# Patient Record
Sex: MALE | URBAN - METROPOLITAN AREA
[De-identification: names, ages, dates, MRNs, and addresses within clinical notes are randomized per-mention and may not be internally consistent; named-entity substitution may affect disease eponyms.]

---

## 2022-01-01 ENCOUNTER — APPOINTMENT (OUTPATIENT)
Dept: CARDIOLOGY | Facility: CLINIC | Age: 32
End: 2022-01-01
Attending: STUDENT IN AN ORGANIZED HEALTH CARE EDUCATION/TRAINING PROGRAM
Payer: COMMERCIAL

## 2022-01-01 ENCOUNTER — HOSPITAL ENCOUNTER (INPATIENT)
Facility: CLINIC | Age: 32
LOS: 1 days | End: 2022-03-27
Admitting: STUDENT IN AN ORGANIZED HEALTH CARE EDUCATION/TRAINING PROGRAM
Payer: COMMERCIAL

## 2022-01-01 ENCOUNTER — APPOINTMENT (OUTPATIENT)
Dept: GENERAL RADIOLOGY | Facility: CLINIC | Age: 32
End: 2022-01-01
Attending: STUDENT IN AN ORGANIZED HEALTH CARE EDUCATION/TRAINING PROGRAM
Payer: COMMERCIAL

## 2022-01-01 ENCOUNTER — APPOINTMENT (OUTPATIENT)
Dept: RADIOLOGY | Facility: HOSPITAL | Age: 32
End: 2022-01-01
Attending: EMERGENCY MEDICINE
Payer: COMMERCIAL

## 2022-01-01 ENCOUNTER — APPOINTMENT (OUTPATIENT)
Dept: NUCLEAR MEDICINE | Facility: CLINIC | Age: 32
End: 2022-01-01
Attending: NURSE PRACTITIONER
Payer: COMMERCIAL

## 2022-01-01 ENCOUNTER — APPOINTMENT (OUTPATIENT)
Dept: CT IMAGING | Facility: CLINIC | Age: 32
End: 2022-01-01
Attending: STUDENT IN AN ORGANIZED HEALTH CARE EDUCATION/TRAINING PROGRAM
Payer: COMMERCIAL

## 2022-01-01 ENCOUNTER — HOSPITAL ENCOUNTER (EMERGENCY)
Facility: HOSPITAL | Age: 32
Discharge: ANOTHER HOSPITAL | End: 2022-03-27
Attending: EMERGENCY MEDICINE | Admitting: EMERGENCY MEDICINE
Payer: COMMERCIAL

## 2022-01-01 VITALS — OXYGEN SATURATION: 86 % | HEART RATE: 120 BPM | RESPIRATION RATE: 26 BRPM | TEMPERATURE: 98.6 F

## 2022-01-01 VITALS
HEART RATE: 91 BPM | DIASTOLIC BLOOD PRESSURE: 36 MMHG | RESPIRATION RATE: 24 BRPM | OXYGEN SATURATION: 71 % | SYSTOLIC BLOOD PRESSURE: 77 MMHG | TEMPERATURE: 94.2 F

## 2022-01-01 DIAGNOSIS — I46.9 CARDIAC ARREST (H): ICD-10-CM

## 2022-01-01 LAB
ABO/RH(D): NORMAL
ALBUMIN SERPL-MCNC: 1.4 G/DL (ref 3.5–5)
ALBUMIN SERPL-MCNC: 1.6 G/DL (ref 3.5–5)
ALBUMIN SERPL-MCNC: 2.6 G/DL (ref 3.4–5)
ALBUMIN UR-MCNC: 70 MG/DL
ALP SERPL-CCNC: 21 U/L (ref 45–120)
ALP SERPL-CCNC: 239 U/L (ref 40–150)
ALP SERPL-CCNC: 84 U/L (ref 45–120)
ALT SERPL W P-5'-P-CCNC: 190 U/L (ref 0–45)
ALT SERPL W P-5'-P-CCNC: 42 U/L (ref 0–45)
ALT SERPL W P-5'-P-CCNC: 435 U/L (ref 0–70)
AMMONIA PLAS-SCNC: 72 UMOL/L (ref 10–50)
AMPHETAMINES UR QL SCN: NORMAL
ANION GAP SERPL CALCULATED.3IONS-SCNC: 12 MMOL/L (ref 3–14)
ANION GAP SERPL CALCULATED.3IONS-SCNC: 15 MMOL/L (ref 5–18)
ANION GAP SERPL CALCULATED.3IONS-SCNC: ABNORMAL MMOL/L
ANTIBODY SCREEN: NEGATIVE
APPEARANCE UR: ABNORMAL
APTT PPP: 83 SECONDS (ref 22–38)
AST SERPL W P-5'-P-CCNC: 227 U/L (ref 0–40)
AST SERPL W P-5'-P-CCNC: 51 U/L (ref 0–40)
AST SERPL W P-5'-P-CCNC: 572 U/L (ref 0–45)
BARBITURATES UR QL: NORMAL
BASE EXCESS BLDA CALC-SCNC: -10.9 MMOL/L (ref -9–1.8)
BASE EXCESS BLDA CALC-SCNC: -11 MMOL/L (ref -9–1.8)
BASE EXCESS BLDA CALC-SCNC: -12.2 MMOL/L (ref -9–1.8)
BASE EXCESS BLDA CALC-SCNC: -12.2 MMOL/L (ref -9–1.8)
BASE EXCESS BLDA CALC-SCNC: -16.9 MMOL/L (ref -9.6–2)
BASE EXCESS BLDA CALC-SCNC: -8 MMOL/L (ref -9.6–2)
BASE EXCESS BLDA CALC-SCNC: -9.6 MMOL/L (ref -9.6–2)
BASE EXCESS BLDV CALC-SCNC: -12 MMOL/L (ref -7.7–1.9)
BASE EXCESS BLDV CALC-SCNC: ABNORMAL MMOL/L
BASOPHILS # BLD MANUAL: 0 10E3/UL (ref 0–0.2)
BASOPHILS # BLD MANUAL: 0 10E3/UL (ref 0–0.2)
BASOPHILS # BLD MANUAL: 0.1 10E3/UL (ref 0–0.2)
BASOPHILS NFR BLD MANUAL: 0 %
BASOPHILS NFR BLD MANUAL: 0 %
BASOPHILS NFR BLD MANUAL: 1 %
BENZODIAZ UR QL: NORMAL
BILIRUB SERPL-MCNC: 0.2 MG/DL (ref 0–1)
BILIRUB SERPL-MCNC: 0.4 MG/DL (ref 0.2–1.3)
BILIRUB SERPL-MCNC: <0.1 MG/DL (ref 0–1)
BILIRUB UR QL STRIP: NEGATIVE
BLD PROD TYP BPU: NORMAL
BLOOD COMPONENT TYPE: NORMAL
BUN SERPL-MCNC: 15 MG/DL (ref 8–22)
BUN SERPL-MCNC: 26 MG/DL (ref 7–30)
BUN SERPL-MCNC: 8 MG/DL (ref 8–22)
BURR CELLS BLD QL SMEAR: ABNORMAL
BURR CELLS BLD QL SMEAR: SLIGHT
BURR CELLS BLD QL SMEAR: SLIGHT
CA-I BLD-MCNC: 4.4 MG/DL (ref 4.4–5.2)
CA-I BLD-MCNC: 4.5 MG/DL (ref 4.4–5.2)
CA-I BLD-MCNC: 4.7 MG/DL (ref 4.4–5.2)
CA-I BLD-MCNC: 4.8 MG/DL (ref 4.4–5.2)
CALCIUM SERPL-MCNC: 4.1 MG/DL (ref 8.5–10.5)
CALCIUM SERPL-MCNC: 5.6 MG/DL (ref 8.5–10.5)
CALCIUM SERPL-MCNC: 8 MG/DL (ref 8.5–10.1)
CANNABINOIDS UR QL SCN: NORMAL
CF REDUC 30M P MA P HEP LENFR BLD TEG: 0 % (ref 0–8)
CF REDUC 60M P MA P HEPASE LENFR BLD TEG: 0 % (ref 0–15)
CFT P HPASE BLD TEG: 1.7 MINUTE (ref 1–3)
CHLORIDE BLD-SCNC: 112 MMOL/L (ref 94–109)
CHLORIDE BLD-SCNC: 118 MMOL/L (ref 98–107)
CHLORIDE BLD-SCNC: <50 MMOL/L (ref 98–107)
CI (COAGULATION INDEX)(Z): 1 (ref -3–3)
CLOT ANGLE P HPASE BLD TEG: 66.3 DEGREES (ref 53–72)
CLOT INIT P HPASE BLD TEG: 4.8 MINUTE (ref 5–10)
CLOT STRENGTH P HPASE BLD TEG: 8 KD/SC (ref 4.5–11)
CO2 SERPL-SCNC: 10 MMOL/L (ref 22–31)
CO2 SERPL-SCNC: 23 MMOL/L (ref 20–32)
CO2 SERPL-SCNC: 37 MMOL/L (ref 22–31)
COCAINE UR QL: NORMAL
CODING SYSTEM: NORMAL
COLOR UR AUTO: ABNORMAL
CORTIS SERPL-MCNC: 23.1 UG/DL (ref 4–22)
CREAT SERPL-MCNC: 0.39 MG/DL (ref 0.7–1.3)
CREAT SERPL-MCNC: 1.51 MG/DL (ref 0.7–1.3)
CREAT SERPL-MCNC: 2.3 MG/DL (ref 0.66–1.25)
CROSSMATCH: NORMAL
CROSSMATCH: NORMAL
CRP SERPL-MCNC: <2.9 MG/L (ref 0–8)
EOSINOPHIL # BLD MANUAL: 0 10E3/UL (ref 0–0.7)
EOSINOPHIL # BLD MANUAL: 0 10E3/UL (ref 0–0.7)
EOSINOPHIL # BLD MANUAL: 0.1 10E3/UL (ref 0–0.7)
EOSINOPHIL NFR BLD MANUAL: 0 %
EOSINOPHIL NFR BLD MANUAL: 0 %
EOSINOPHIL NFR BLD MANUAL: 1 %
ERYTHROCYTE [DISTWIDTH] IN BLOOD BY AUTOMATED COUNT: 13.5 % (ref 10–15)
ERYTHROCYTE [DISTWIDTH] IN BLOOD BY AUTOMATED COUNT: 13.6 % (ref 10–15)
ERYTHROCYTE [DISTWIDTH] IN BLOOD BY AUTOMATED COUNT: 13.6 % (ref 10–15)
ERYTHROCYTE [SEDIMENTATION RATE] IN BLOOD BY WESTERGREN METHOD: 3 MM/HR (ref 0–15)
ETHANOL UR QL SCN: NORMAL
FRAGMENTS BLD QL SMEAR: SLIGHT
GFR SERPL CREATININE-BSD FRML MDRD: 38 ML/MIN/1.73M2
GFR SERPL CREATININE-BSD FRML MDRD: 63 ML/MIN/1.73M2
GFR SERPL CREATININE-BSD FRML MDRD: >90 ML/MIN/1.73M2
GLUCOSE BLD-MCNC: 187 MG/DL (ref 70–125)
GLUCOSE BLD-MCNC: 358 MG/DL (ref 70–99)
GLUCOSE BLD-MCNC: 369 MG/DL (ref 70–99)
GLUCOSE BLD-MCNC: 385 MG/DL (ref 70–99)
GLUCOSE BLD-MCNC: 414 MG/DL (ref 70–125)
GLUCOSE BLD-MCNC: 417 MG/DL (ref 70–99)
GLUCOSE BLD-MCNC: 449 MG/DL (ref 70–99)
GLUCOSE BLDC GLUCOMTR-MCNC: 103 MG/DL (ref 70–99)
GLUCOSE BLDC GLUCOMTR-MCNC: 131 MG/DL (ref 70–99)
GLUCOSE BLDC GLUCOMTR-MCNC: 165 MG/DL (ref 70–99)
GLUCOSE BLDC GLUCOMTR-MCNC: 189 MG/DL (ref 70–99)
GLUCOSE BLDC GLUCOMTR-MCNC: 221 MG/DL (ref 70–99)
GLUCOSE BLDC GLUCOMTR-MCNC: 275 MG/DL (ref 70–99)
GLUCOSE BLDC GLUCOMTR-MCNC: 287 MG/DL (ref 70–99)
GLUCOSE BLDC GLUCOMTR-MCNC: 294 MG/DL (ref 70–99)
GLUCOSE BLDC GLUCOMTR-MCNC: 330 MG/DL (ref 70–99)
GLUCOSE BLDC GLUCOMTR-MCNC: 99 MG/DL (ref 70–99)
GLUCOSE UR STRIP-MCNC: 300 MG/DL
HBA1C MFR BLD: 5.6 % (ref 0–5.6)
HCO3 BLD-SCNC: 20 MMOL/L (ref 21–28)
HCO3 BLD-SCNC: 21 MMOL/L (ref 21–28)
HCO3 BLD-SCNC: 22 MMOL/L (ref 21–28)
HCO3 BLD-SCNC: 23 MMOL/L (ref 21–28)
HCO3 BLDA-SCNC: 17 MMOL/L (ref 21–28)
HCO3 BLDA-SCNC: 23 MMOL/L (ref 21–28)
HCO3 BLDA-SCNC: 25 MMOL/L (ref 21–28)
HCO3 BLDV-SCNC: 23 MMOL/L (ref 21–28)
HCO3 BLDV-SCNC: ABNORMAL MMOL/L
HCT VFR BLD AUTO: 13.7 % (ref 40–53)
HCT VFR BLD AUTO: 53.6 % (ref 40–53)
HCT VFR BLD AUTO: 53.7 % (ref 40–53)
HGB BLD-MCNC: 14.4 G/DL (ref 13.3–17.7)
HGB BLD-MCNC: 15.8 G/DL (ref 13.3–17.7)
HGB BLD-MCNC: 15.9 G/DL (ref 13.3–17.7)
HGB BLD-MCNC: 16.6 G/DL (ref 13.3–17.7)
HGB BLD-MCNC: 16.6 G/DL (ref 13.3–17.7)
HGB BLD-MCNC: 4.7 G/DL (ref 13.3–17.7)
HGB UR QL STRIP: ABNORMAL
HOLD SPECIMEN: NORMAL
HYALINE CASTS: 6 /LPF
INR PPP: 1.7 (ref 0.85–1.15)
INR PPP: 2.25 (ref 0.85–1.15)
ISSUE DATE AND TIME: NORMAL
KETONES UR STRIP-MCNC: NEGATIVE MG/DL
LACTATE BLD-SCNC: 6.3 MMOL/L
LACTATE BLD-SCNC: 7.6 MMOL/L
LACTATE BLD-SCNC: 7.9 MMOL/L
LACTATE SERPL-SCNC: 10.1 MMOL/L (ref 0.7–2)
LACTATE SERPL-SCNC: 6.2 MMOL/L (ref 0.7–2)
LACTATE SERPL-SCNC: 6.3 MMOL/L (ref 0.7–2)
LACTATE SERPL-SCNC: 7.3 MMOL/L (ref 0.7–2)
LACTATE SERPL-SCNC: 7.4 MMOL/L (ref 0.7–2)
LACTATE SERPL-SCNC: 7.8 MMOL/L (ref 0.7–2)
LDH SERPL L TO P-CCNC: 1153 U/L (ref 85–227)
LEUKOCYTE ESTERASE UR QL STRIP: NEGATIVE
LVEF ECHO: NORMAL
LYMPHOCYTES # BLD MANUAL: 2.6 10E3/UL (ref 0.8–5.3)
LYMPHOCYTES # BLD MANUAL: 2.9 10E3/UL (ref 0.8–5.3)
LYMPHOCYTES # BLD MANUAL: 3.8 10E3/UL (ref 0.8–5.3)
LYMPHOCYTES NFR BLD MANUAL: 46 %
LYMPHOCYTES NFR BLD MANUAL: 50 %
LYMPHOCYTES NFR BLD MANUAL: 60 %
MCF P HPASE BLD TEG: 61.4 MM (ref 50–70)
MCH RBC QN AUTO: 30 PG (ref 26.5–33)
MCH RBC QN AUTO: 30 PG (ref 26.5–33)
MCH RBC QN AUTO: 30.3 PG (ref 26.5–33)
MCHC RBC AUTO-ENTMCNC: 30.9 G/DL (ref 31.5–36.5)
MCHC RBC AUTO-ENTMCNC: 31 G/DL (ref 31.5–36.5)
MCHC RBC AUTO-ENTMCNC: 34.3 G/DL (ref 31.5–36.5)
MCV RBC AUTO: 88 FL (ref 78–100)
MCV RBC AUTO: 97 FL (ref 78–100)
MCV RBC AUTO: 97 FL (ref 78–100)
METAMYELOCYTES # BLD MANUAL: 0.2 10E3/UL
METAMYELOCYTES NFR BLD MANUAL: 4 %
MONOCYTES # BLD MANUAL: 0 10E3/UL (ref 0–1.3)
MONOCYTES # BLD MANUAL: 0 10E3/UL (ref 0–1.3)
MONOCYTES # BLD MANUAL: 0.2 10E3/UL (ref 0–1.3)
MONOCYTES NFR BLD MANUAL: 0 %
MONOCYTES NFR BLD MANUAL: 0 %
MONOCYTES NFR BLD MANUAL: 3 %
MUCOUS THREADS #/AREA URNS LPF: PRESENT /LPF
MYELOCYTES # BLD MANUAL: 0.2 10E3/UL
MYELOCYTES # BLD MANUAL: 0.3 10E3/UL
MYELOCYTES # BLD MANUAL: 0.4 10E3/UL
MYELOCYTES NFR BLD MANUAL: 3 %
MYELOCYTES NFR BLD MANUAL: 5 %
MYELOCYTES NFR BLD MANUAL: 7 %
NEUTROPHILS # BLD MANUAL: 2 10E3/UL (ref 1.6–8.3)
NEUTROPHILS # BLD MANUAL: 2.1 10E3/UL (ref 1.6–8.3)
NEUTROPHILS # BLD MANUAL: 3 10E3/UL (ref 1.6–8.3)
NEUTROPHILS NFR BLD MANUAL: 33 %
NEUTROPHILS NFR BLD MANUAL: 40 %
NEUTROPHILS NFR BLD MANUAL: 47 %
NITRATE UR QL: NEGATIVE
NRBC # BLD AUTO: 0.1 10E3/UL
NRBC # BLD AUTO: 0.2 10E3/UL
NRBC # BLD AUTO: 0.7 10E3/UL
NRBC BLD MANUAL-RTO: 13 %
NRBC BLD MANUAL-RTO: 2 %
NRBC BLD MANUAL-RTO: 3 %
O2/TOTAL GAS SETTING VFR VENT: 100 %
OPIATES UR QL SCN: NORMAL
OXYHGB MFR BLD: 32 % (ref 92–100)
OXYHGB MFR BLD: 35 % (ref 92–100)
OXYHGB MFR BLD: 40 % (ref 92–100)
OXYHGB MFR BLD: 54 % (ref 92–100)
OXYHGB MFR BLDA: 47 % (ref 92–100)
OXYHGB MFR BLDA: 54 % (ref 92–100)
OXYHGB MFR BLDA: 76 % (ref 92–100)
OXYHGB MFR BLDV: 16.9 % (ref 70–75)
OXYHGB MFR BLDV: 20 % (ref 70–75)
PCO2 BLD: 74 MM HG (ref 35–45)
PCO2 BLD: 82 MM HG (ref 35–45)
PCO2 BLD: 82 MM HG (ref 35–45)
PCO2 BLD: 91 MM HG (ref 35–45)
PCO2 BLDA: 83 MM HG (ref 35–45)
PCO2 BLDA: 84 MM HG (ref 35–45)
PCO2 BLDA: 88 MM HG (ref 35–45)
PCO2 BLDV: 97 MM HG (ref 40–50)
PCO2 BLDV: >98 MM HG (ref 35–50)
PH BLD: 7.01 [PH] (ref 7.35–7.45)
PH BLD: 7.02 [PH] (ref 7.35–7.45)
PH BLD: 7.04 [PH] (ref 7.35–7.45)
PH BLD: 7.05 [PH] (ref 7.35–7.45)
PH BLDA: 6.93 [PH] (ref 7.35–7.45)
PH BLDA: 7.05 [PH] (ref 7.35–7.45)
PH BLDA: 7.05 [PH] (ref 7.35–7.45)
PH BLDV: 6.98 [PH] (ref 7.32–7.43)
PH BLDV: 7.66 [PH] (ref 7.35–7.45)
PH UR STRIP: 6 [PH] (ref 5–7)
PLAT MORPH BLD: ABNORMAL
PLATELET # BLD AUTO: 103 10E3/UL (ref 150–450)
PLATELET # BLD AUTO: 251 10E3/UL (ref 150–450)
PLATELET # BLD AUTO: 253 10E3/UL (ref 150–450)
PO2 BLD: 25 MM HG (ref 80–105)
PO2 BLD: 26 MM HG (ref 80–105)
PO2 BLD: 29 MM HG (ref 80–105)
PO2 BLD: 34 MM HG (ref 80–105)
PO2 BLDA: 35 MM HG (ref 80–105)
PO2 BLDA: 35 MM HG (ref 80–105)
PO2 BLDA: 53 MM HG (ref 80–105)
PO2 BLDV: 16 MM HG (ref 25–47)
PO2 BLDV: 20 MM HG (ref 25–47)
POTASSIUM BLD-SCNC: 1.7 MMOL/L (ref 3.5–5)
POTASSIUM BLD-SCNC: 4.1 MMOL/L (ref 3.4–5.3)
POTASSIUM BLD-SCNC: 4.2 MMOL/L (ref 3.5–5)
POTASSIUM BLD-SCNC: 4.9 MMOL/L (ref 3.5–5)
POTASSIUM BLD-SCNC: 4.9 MMOL/L (ref 3.5–5)
POTASSIUM BLD-SCNC: 6.4 MMOL/L (ref 3.5–5)
PROCALCITONIN SERPL-MCNC: 1.82 NG/ML
PROT SERPL-MCNC: 1 G/DL (ref 6–8)
PROT SERPL-MCNC: 2.8 G/DL (ref 6–8)
PROT SERPL-MCNC: 5.5 G/DL (ref 6.8–8.8)
RADIOLOGIST FLAGS: ABNORMAL
RADIOLOGIST FLAGS: ABNORMAL
RBC # BLD AUTO: 1.55 10E6/UL (ref 4.4–5.9)
RBC # BLD AUTO: 5.53 10E6/UL (ref 4.4–5.9)
RBC # BLD AUTO: 5.54 10E6/UL (ref 4.4–5.9)
RBC MORPH BLD: ABNORMAL
RBC URINE: 1 /HPF
SAO2 % BLDV: 17.2 % (ref 70–75)
SARS-COV-2 RNA RESP QL NAA+PROBE: POSITIVE
SMUDGE CELLS BLD QL SMEAR: PRESENT
SODIUM BLD-SCNC: 139 MMOL/L (ref 133–144)
SODIUM BLD-SCNC: 145 MMOL/L (ref 133–144)
SODIUM BLD-SCNC: 146 MMOL/L (ref 133–144)
SODIUM SERPL-SCNC: 143 MMOL/L (ref 136–145)
SODIUM SERPL-SCNC: 147 MMOL/L (ref 133–144)
SODIUM SERPL-SCNC: >180 MMOL/L (ref 136–145)
SP GR UR STRIP: 1.02 (ref 1–1.03)
SPECIMEN EXPIRATION DATE: NORMAL
SPERM #/AREA URNS HPF: PRESENT /HPF
SQUAMOUS EPITHELIAL: 1 /HPF
TROPONIN I SERPL HS-MCNC: 3078 NG/L
TROPONIN I SERPL HS-MCNC: 6151 NG/L
TROPONIN I SERPL-MCNC: 0.08 NG/ML (ref 0–0.29)
TROPONIN I SERPL-MCNC: 0.65 NG/ML (ref 0–0.29)
UNIT ABO/RH: NORMAL
UNIT NUMBER: NORMAL
UNIT STATUS: NORMAL
UNIT TYPE ISBT: 9500
UROBILINOGEN UR STRIP-MCNC: NORMAL MG/DL
WBC # BLD AUTO: 5.1 10E3/UL (ref 4–11)
WBC # BLD AUTO: 6.3 10E3/UL (ref 4–11)
WBC # BLD AUTO: 6.4 10E3/UL (ref 4–11)
WBC URINE: 3 /HPF

## 2022-01-01 PROCEDURE — 83605 ASSAY OF LACTIC ACID: CPT | Performed by: EMERGENCY MEDICINE

## 2022-01-01 PROCEDURE — 82533 TOTAL CORTISOL: CPT | Performed by: STUDENT IN AN ORGANIZED HEALTH CARE EDUCATION/TRAINING PROGRAM

## 2022-01-01 PROCEDURE — 93306 TTE W/DOPPLER COMPLETE: CPT | Mod: 26 | Performed by: INTERNAL MEDICINE

## 2022-01-01 PROCEDURE — 85652 RBC SED RATE AUTOMATED: CPT | Performed by: STUDENT IN AN ORGANIZED HEALTH CARE EDUCATION/TRAINING PROGRAM

## 2022-01-01 PROCEDURE — A9521 TC99M EXAMETAZIME: HCPCS

## 2022-01-01 PROCEDURE — 36415 COLL VENOUS BLD VENIPUNCTURE: CPT | Performed by: EMERGENCY MEDICINE

## 2022-01-01 PROCEDURE — 36620 INSERTION CATHETER ARTERY: CPT | Performed by: INTERNAL MEDICINE

## 2022-01-01 PROCEDURE — 85730 THROMBOPLASTIN TIME PARTIAL: CPT | Performed by: EMERGENCY MEDICINE

## 2022-01-01 PROCEDURE — 250N000009 HC RX 250: Performed by: INTERNAL MEDICINE

## 2022-01-01 PROCEDURE — 71045 X-RAY EXAM CHEST 1 VIEW: CPT

## 2022-01-01 PROCEDURE — 80354 DRUG SCREENING FENTANYL: CPT | Performed by: STUDENT IN AN ORGANIZED HEALTH CARE EDUCATION/TRAINING PROGRAM

## 2022-01-01 PROCEDURE — 250N000011 HC RX IP 250 OP 636: Performed by: STUDENT IN AN ORGANIZED HEALTH CARE EDUCATION/TRAINING PROGRAM

## 2022-01-01 PROCEDURE — 99207 PR SC NO CHARGE VISIT: CPT | Performed by: PSYCHIATRY & NEUROLOGY

## 2022-01-01 PROCEDURE — 83615 LACTATE (LD) (LDH) ENZYME: CPT | Performed by: STUDENT IN AN ORGANIZED HEALTH CARE EDUCATION/TRAINING PROGRAM

## 2022-01-01 PROCEDURE — 80307 DRUG TEST PRSMV CHEM ANLYZR: CPT | Performed by: STUDENT IN AN ORGANIZED HEALTH CARE EDUCATION/TRAINING PROGRAM

## 2022-01-01 PROCEDURE — 82805 BLOOD GASES W/O2 SATURATION: CPT | Performed by: STUDENT IN AN ORGANIZED HEALTH CARE EDUCATION/TRAINING PROGRAM

## 2022-01-01 PROCEDURE — 999N000157 HC STATISTIC RCP TIME EA 10 MIN

## 2022-01-01 PROCEDURE — 258N000003 HC RX IP 258 OP 636: Performed by: STUDENT IN AN ORGANIZED HEALTH CARE EDUCATION/TRAINING PROGRAM

## 2022-01-01 PROCEDURE — C9113 INJ PANTOPRAZOLE SODIUM, VIA: HCPCS | Performed by: STUDENT IN AN ORGANIZED HEALTH CARE EDUCATION/TRAINING PROGRAM

## 2022-01-01 PROCEDURE — 74176 CT ABD & PELVIS W/O CONTRAST: CPT | Mod: 26 | Performed by: RADIOLOGY

## 2022-01-01 PROCEDURE — 999N000065 XR CHEST PORT 1 VIEW

## 2022-01-01 PROCEDURE — 87635 SARS-COV-2 COVID-19 AMP PRB: CPT | Performed by: EMERGENCY MEDICINE

## 2022-01-01 PROCEDURE — 272N000001 HC OR GENERAL SUPPLY STERILE: Performed by: INTERNAL MEDICINE

## 2022-01-01 PROCEDURE — 78606 BRAIN IMAGE W/FLOW 4 + VIEWS: CPT | Mod: 26 | Performed by: RADIOLOGY

## 2022-01-01 PROCEDURE — 200N000002 HC R&B ICU UMMC

## 2022-01-01 PROCEDURE — 86850 RBC ANTIBODY SCREEN: CPT | Performed by: STUDENT IN AN ORGANIZED HEALTH CARE EDUCATION/TRAINING PROGRAM

## 2022-01-01 PROCEDURE — 999N000158 HC STATISTIC RCP TIME ED VENT EA 10 MIN

## 2022-01-01 PROCEDURE — 86316 IMMUNOASSAY TUMOR OTHER: CPT | Performed by: STUDENT IN AN ORGANIZED HEALTH CARE EDUCATION/TRAINING PROGRAM

## 2022-01-01 PROCEDURE — 86923 COMPATIBILITY TEST ELECTRIC: CPT

## 2022-01-01 PROCEDURE — P9016 RBC LEUKOCYTES REDUCED: HCPCS

## 2022-01-01 PROCEDURE — 85396 CLOTTING ASSAY WHOLE BLOOD: CPT | Performed by: STUDENT IN AN ORGANIZED HEALTH CARE EDUCATION/TRAINING PROGRAM

## 2022-01-01 PROCEDURE — 71045 X-RAY EXAM CHEST 1 VIEW: CPT | Mod: 26 | Performed by: RADIOLOGY

## 2022-01-01 PROCEDURE — 93005 ELECTROCARDIOGRAM TRACING: CPT

## 2022-01-01 PROCEDURE — 78606 BRAIN IMAGE W/FLOW 4 + VIEWS: CPT

## 2022-01-01 PROCEDURE — 70450 CT HEAD/BRAIN W/O DYE: CPT

## 2022-01-01 PROCEDURE — 87040 BLOOD CULTURE FOR BACTERIA: CPT | Performed by: STUDENT IN AN ORGANIZED HEALTH CARE EDUCATION/TRAINING PROGRAM

## 2022-01-01 PROCEDURE — 5A1935Z RESPIRATORY VENTILATION, LESS THAN 24 CONSECUTIVE HOURS: ICD-10-PCS | Performed by: STUDENT IN AN ORGANIZED HEALTH CARE EDUCATION/TRAINING PROGRAM

## 2022-01-01 PROCEDURE — 99291 CRITICAL CARE FIRST HOUR: CPT | Mod: 25 | Performed by: STUDENT IN AN ORGANIZED HEALTH CARE EDUCATION/TRAINING PROGRAM

## 2022-01-01 PROCEDURE — 999N000026 HC STATISTIC CARDIOPULM RESUSCITATION

## 2022-01-01 PROCEDURE — 85014 HEMATOCRIT: CPT

## 2022-01-01 PROCEDURE — 83036 HEMOGLOBIN GLYCOSYLATED A1C: CPT | Performed by: STUDENT IN AN ORGANIZED HEALTH CARE EDUCATION/TRAINING PROGRAM

## 2022-01-01 PROCEDURE — 71250 CT THORAX DX C-: CPT | Mod: 26 | Performed by: RADIOLOGY

## 2022-01-01 PROCEDURE — 258N000003 HC RX IP 258 OP 636: Performed by: INTERNAL MEDICINE

## 2022-01-01 PROCEDURE — 83605 ASSAY OF LACTIC ACID: CPT | Performed by: STUDENT IN AN ORGANIZED HEALTH CARE EDUCATION/TRAINING PROGRAM

## 2022-01-01 PROCEDURE — 99221 1ST HOSP IP/OBS SF/LOW 40: CPT | Performed by: PSYCHIATRY & NEUROLOGY

## 2022-01-01 PROCEDURE — 84484 ASSAY OF TROPONIN QUANT: CPT | Performed by: STUDENT IN AN ORGANIZED HEALTH CARE EDUCATION/TRAINING PROGRAM

## 2022-01-01 PROCEDURE — 85027 COMPLETE CBC AUTOMATED: CPT | Performed by: STUDENT IN AN ORGANIZED HEALTH CARE EDUCATION/TRAINING PROGRAM

## 2022-01-01 PROCEDURE — 250N000011 HC RX IP 250 OP 636

## 2022-01-01 PROCEDURE — 84155 ASSAY OF PROTEIN SERUM: CPT | Performed by: STUDENT IN AN ORGANIZED HEALTH CARE EDUCATION/TRAINING PROGRAM

## 2022-01-01 PROCEDURE — 86140 C-REACTIVE PROTEIN: CPT | Performed by: STUDENT IN AN ORGANIZED HEALTH CARE EDUCATION/TRAINING PROGRAM

## 2022-01-01 PROCEDURE — 81001 URINALYSIS AUTO W/SCOPE: CPT | Performed by: STUDENT IN AN ORGANIZED HEALTH CARE EDUCATION/TRAINING PROGRAM

## 2022-01-01 PROCEDURE — 82140 ASSAY OF AMMONIA: CPT

## 2022-01-01 PROCEDURE — 84145 PROCALCITONIN (PCT): CPT | Performed by: STUDENT IN AN ORGANIZED HEALTH CARE EDUCATION/TRAINING PROGRAM

## 2022-01-01 PROCEDURE — 84132 ASSAY OF SERUM POTASSIUM: CPT

## 2022-01-01 PROCEDURE — 99285 EMERGENCY DEPT VISIT HI MDM: CPT | Mod: 25

## 2022-01-01 PROCEDURE — 84450 TRANSFERASE (AST) (SGOT): CPT | Performed by: EMERGENCY MEDICINE

## 2022-01-01 PROCEDURE — 343N000001 HC RX 343

## 2022-01-01 PROCEDURE — 85610 PROTHROMBIN TIME: CPT | Performed by: EMERGENCY MEDICINE

## 2022-01-01 PROCEDURE — 84484 ASSAY OF TROPONIN QUANT: CPT | Performed by: EMERGENCY MEDICINE

## 2022-01-01 PROCEDURE — 36415 COLL VENOUS BLD VENIPUNCTURE: CPT | Performed by: STUDENT IN AN ORGANIZED HEALTH CARE EDUCATION/TRAINING PROGRAM

## 2022-01-01 PROCEDURE — 36556 INSERT NON-TUNNEL CV CATH: CPT | Performed by: INTERNAL MEDICINE

## 2022-01-01 PROCEDURE — 82803 BLOOD GASES ANY COMBINATION: CPT

## 2022-01-01 PROCEDURE — 70450 CT HEAD/BRAIN W/O DYE: CPT | Mod: 26 | Performed by: RADIOLOGY

## 2022-01-01 PROCEDURE — 94002 VENT MGMT INPAT INIT DAY: CPT

## 2022-01-01 PROCEDURE — 92950 HEART/LUNG RESUSCITATION CPR: CPT

## 2022-01-01 PROCEDURE — 96374 THER/PROPH/DIAG INJ IV PUSH: CPT

## 2022-01-01 PROCEDURE — C9803 HOPD COVID-19 SPEC COLLECT: HCPCS

## 2022-01-01 PROCEDURE — 82947 ASSAY GLUCOSE BLOOD QUANT: CPT | Performed by: STUDENT IN AN ORGANIZED HEALTH CARE EDUCATION/TRAINING PROGRAM

## 2022-01-01 PROCEDURE — 999N000185 HC STATISTIC TRANSPORT TIME EA 15 MIN

## 2022-01-01 PROCEDURE — 4A023N7 MEASUREMENT OF CARDIAC SAMPLING AND PRESSURE, LEFT HEART, PERCUTANEOUS APPROACH: ICD-10-PCS | Performed by: INTERNAL MEDICINE

## 2022-01-01 PROCEDURE — 82330 ASSAY OF CALCIUM: CPT | Performed by: STUDENT IN AN ORGANIZED HEALTH CARE EDUCATION/TRAINING PROGRAM

## 2022-01-01 PROCEDURE — 93010 ELECTROCARDIOGRAM REPORT: CPT | Mod: 76 | Performed by: INTERNAL MEDICINE

## 2022-01-01 PROCEDURE — 99292 CRITICAL CARE ADDL 30 MIN: CPT | Performed by: STUDENT IN AN ORGANIZED HEALTH CARE EDUCATION/TRAINING PROGRAM

## 2022-01-01 PROCEDURE — 82805 BLOOD GASES W/O2 SATURATION: CPT | Performed by: EMERGENCY MEDICINE

## 2022-01-01 PROCEDURE — 82810 BLOOD GASES O2 SAT ONLY: CPT

## 2022-01-01 PROCEDURE — 250N000009 HC RX 250: Performed by: STUDENT IN AN ORGANIZED HEALTH CARE EDUCATION/TRAINING PROGRAM

## 2022-01-01 PROCEDURE — C1751 CATH, INF, PER/CENT/MIDLINE: HCPCS | Performed by: INTERNAL MEDICINE

## 2022-01-01 PROCEDURE — 84132 ASSAY OF SERUM POTASSIUM: CPT | Performed by: STUDENT IN AN ORGANIZED HEALTH CARE EDUCATION/TRAINING PROGRAM

## 2022-01-01 PROCEDURE — 250N000011 HC RX IP 250 OP 636: Performed by: EMERGENCY MEDICINE

## 2022-01-01 PROCEDURE — 36430 TRANSFUSION BLD/BLD COMPNT: CPT | Mod: 59

## 2022-01-01 PROCEDURE — 93306 TTE W/DOPPLER COMPLETE: CPT

## 2022-01-01 PROCEDURE — 85610 PROTHROMBIN TIME: CPT | Performed by: STUDENT IN AN ORGANIZED HEALTH CARE EDUCATION/TRAINING PROGRAM

## 2022-01-01 PROCEDURE — 250N000009 HC RX 250

## 2022-01-01 PROCEDURE — 85027 COMPLETE CBC AUTOMATED: CPT | Performed by: EMERGENCY MEDICINE

## 2022-01-01 PROCEDURE — 250N000011 HC RX IP 250 OP 636: Performed by: INTERNAL MEDICINE

## 2022-01-01 PROCEDURE — C9113 INJ PANTOPRAZOLE SODIUM, VIA: HCPCS | Performed by: EMERGENCY MEDICINE

## 2022-01-01 PROCEDURE — 71250 CT THORAX DX C-: CPT

## 2022-01-01 PROCEDURE — C1894 INTRO/SHEATH, NON-LASER: HCPCS | Performed by: INTERNAL MEDICINE

## 2022-01-01 PROCEDURE — 258N000003 HC RX IP 258 OP 636

## 2022-01-01 RX ORDER — POTASSIUM CHLORIDE 14.9 MG/ML
20 INJECTION INTRAVENOUS
Status: DISCONTINUED | OUTPATIENT
Start: 2022-01-01 | End: 2022-01-01 | Stop reason: HOSPADM

## 2022-01-01 RX ORDER — NOREPINEPHRINE BITARTRATE 0.06 MG/ML
.01-.6 INJECTION, SOLUTION INTRAVENOUS CONTINUOUS PRN
Status: DISCONTINUED | OUTPATIENT
Start: 2022-01-01 | End: 2022-01-01

## 2022-01-01 RX ORDER — NALOXONE HYDROCHLORIDE 0.4 MG/ML
0.4 INJECTION, SOLUTION INTRAMUSCULAR; INTRAVENOUS; SUBCUTANEOUS
Status: DISCONTINUED | OUTPATIENT
Start: 2022-01-01 | End: 2022-01-01 | Stop reason: HOSPADM

## 2022-01-01 RX ORDER — DEXTROSE MONOHYDRATE 100 MG/ML
INJECTION, SOLUTION INTRAVENOUS CONTINUOUS PRN
Status: DISCONTINUED | OUTPATIENT
Start: 2022-01-01 | End: 2022-01-01 | Stop reason: HOSPADM

## 2022-01-01 RX ORDER — DEXTROSE MONOHYDRATE 25 G/50ML
25-50 INJECTION, SOLUTION INTRAVENOUS
Status: DISCONTINUED | OUTPATIENT
Start: 2022-01-01 | End: 2022-01-01 | Stop reason: HOSPADM

## 2022-01-01 RX ORDER — MAGNESIUM SULFATE HEPTAHYDRATE 40 MG/ML
2 INJECTION, SOLUTION INTRAVENOUS DAILY PRN
Status: DISCONTINUED | OUTPATIENT
Start: 2022-01-01 | End: 2022-01-01 | Stop reason: HOSPADM

## 2022-01-01 RX ORDER — MAGNESIUM SULFATE HEPTAHYDRATE 40 MG/ML
4 INJECTION, SOLUTION INTRAVENOUS EVERY 4 HOURS PRN
Status: DISCONTINUED | OUTPATIENT
Start: 2022-01-01 | End: 2022-01-01 | Stop reason: HOSPADM

## 2022-01-01 RX ORDER — LIDOCAINE 40 MG/G
CREAM TOPICAL
Status: DISCONTINUED | OUTPATIENT
Start: 2022-01-01 | End: 2022-01-01 | Stop reason: HOSPADM

## 2022-01-01 RX ORDER — FENTANYL CITRATE-0.9 % NACL/PF 10 MCG/ML
PLASTIC BAG, INJECTION (ML) INTRAVENOUS
Status: DISCONTINUED | OUTPATIENT
Start: 2022-01-01 | End: 2022-01-01 | Stop reason: HOSPADM

## 2022-01-01 RX ORDER — NALOXONE HYDROCHLORIDE 0.4 MG/ML
0.2 INJECTION, SOLUTION INTRAMUSCULAR; INTRAVENOUS; SUBCUTANEOUS
Status: DISCONTINUED | OUTPATIENT
Start: 2022-01-01 | End: 2022-01-01 | Stop reason: HOSPADM

## 2022-01-01 RX ORDER — FUROSEMIDE 10 MG/ML
INJECTION INTRAMUSCULAR; INTRAVENOUS
Status: DISCONTINUED | OUTPATIENT
Start: 2022-01-01 | End: 2022-01-01 | Stop reason: HOSPADM

## 2022-01-01 RX ORDER — NOREPINEPHRINE BITARTRATE 0.06 MG/ML
.01-.6 INJECTION, SOLUTION INTRAVENOUS CONTINUOUS
Status: DISCONTINUED | OUTPATIENT
Start: 2022-01-01 | End: 2022-01-01 | Stop reason: HOSPADM

## 2022-01-01 RX ORDER — NOREPINEPHRINE BITARTRATE 0.06 MG/ML
INJECTION, SOLUTION INTRAVENOUS CONTINUOUS PRN
Status: COMPLETED | OUTPATIENT
Start: 2022-01-01 | End: 2022-01-01

## 2022-01-01 RX ORDER — NICOTINE POLACRILEX 4 MG
15-30 LOZENGE BUCCAL
Status: DISCONTINUED | OUTPATIENT
Start: 2022-01-01 | End: 2022-01-01 | Stop reason: HOSPADM

## 2022-01-01 RX ORDER — MEPERIDINE HYDROCHLORIDE 25 MG/ML
25-50 INJECTION INTRAMUSCULAR; INTRAVENOUS; SUBCUTANEOUS
Status: DISCONTINUED | OUTPATIENT
Start: 2022-01-01 | End: 2022-01-01 | Stop reason: HOSPADM

## 2022-01-01 RX ORDER — EPINEPHRINE IN SOD CHLOR,ISO 1 MG/10 ML
SYRINGE (ML) INTRAVENOUS
Status: DISCONTINUED | OUTPATIENT
Start: 2022-01-01 | End: 2022-01-01 | Stop reason: HOSPADM

## 2022-01-01 RX ADMIN — HUMAN INSULIN 3 UNITS/HR: 100 INJECTION, SOLUTION SUBCUTANEOUS at 10:56

## 2022-01-01 RX ADMIN — VASOPRESSIN 4 UNITS/HR: 20 INJECTION INTRAVENOUS at 08:25

## 2022-01-01 RX ADMIN — PANTOPRAZOLE SODIUM 40 MG: 40 INJECTION, POWDER, FOR SOLUTION INTRAVENOUS at 09:27

## 2022-01-01 RX ADMIN — Medication 0.6 MCG/KG/MIN: at 13:44

## 2022-01-01 RX ADMIN — PANTOPRAZOLE SODIUM 80 MG: 40 INJECTION, POWDER, FOR SOLUTION INTRAVENOUS at 04:39

## 2022-01-01 RX ADMIN — Medication 0.6 MCG/KG/MIN: at 09:58

## 2022-01-01 RX ADMIN — Medication: at 13:45

## 2022-01-01 RX ADMIN — EPINEPHRINE 0.3 MCG/KG/MIN: 1 INJECTION PARENTERAL at 08:01

## 2022-01-01 RX ADMIN — Medication 4 UNITS/HR: at 15:41

## 2022-01-01 RX ADMIN — Medication 0.6 MCG/KG/MIN: at 17:55

## 2022-01-01 RX ADMIN — EPINEPHRINE 0.3 MCG/KG/MIN: 1 INJECTION PARENTERAL at 10:36

## 2022-01-01 RX ADMIN — EXAMETAZIME 23.9 MILLICURIE: KIT at 16:39

## 2022-01-01 RX ADMIN — Medication 4 UNITS/HR: at 11:08

## 2022-01-01 ASSESSMENT — ACTIVITIES OF DAILY LIVING (ADL)
ADLS_ACUITY_SCORE: 14
ADLS_ACUITY_SCORE: 16
ADLS_ACUITY_SCORE: 16
ADLS_ACUITY_SCORE: 14
ADLS_ACUITY_SCORE: 14
ADLS_ACUITY_SCORE: 12
ADLS_ACUITY_SCORE: 14
ADLS_ACUITY_SCORE: 12
ADLS_ACUITY_SCORE: 12
ADLS_ACUITY_SCORE: 14
ADLS_ACUITY_SCORE: 12
ADLS_ACUITY_SCORE: 14
ADLS_ACUITY_SCORE: 12

## 2022-03-27 PROBLEM — F17.200 TOBACCO USE DISORDER: Status: ACTIVE | Noted: 2021-03-14

## 2022-03-27 PROBLEM — Z59.00 HOMELESSNESS: Status: ACTIVE | Noted: 2021-03-14

## 2022-03-27 PROBLEM — E87.6 LOW SERUM POTASSIUM LEVEL: Status: ACTIVE | Noted: 2022-01-01

## 2022-03-27 PROBLEM — I46.9 CARDIAC ARREST (H): Status: ACTIVE | Noted: 2022-01-01

## 2022-03-27 PROBLEM — N17.0 ACUTE KIDNEY FAILURE WITH TUBULAR NECROSIS (H): Status: ACTIVE | Noted: 2022-01-01

## 2022-03-27 PROBLEM — L30.9 ECZEMA: Status: ACTIVE | Noted: 2022-01-01

## 2022-03-27 PROBLEM — J30.2 SEASONAL ALLERGIES: Status: ACTIVE | Noted: 2022-01-01

## 2022-03-27 NOTE — PROVIDER NOTIFICATION
VBG , ABG, lactic results called to CSI.  PH, pco2, po2, and lactic acid remain critical.     No further interventions at this time beyond continuing maxed pressors and warming.

## 2022-03-27 NOTE — PROGRESS NOTES
Per CSI and NeuroCrit: rewarm the patient.  Teams are in agreement that warming incrementally is futile.    Will set temp on thermoguard to 37 and rewarm as instructed.    Patient remains maxed on pressors x3.    DNR/DNI

## 2022-03-27 NOTE — PROGRESS NOTES
[facility] initial referral made. Coordinator Lauren. Reference number [id number]. No determination at this time, referral left open related to patients condition, coordinator ask to be updated if or when patients condition changes. Lashonda Rodriguez RN on 3/27/2022 at 10:40 AM

## 2022-03-27 NOTE — PHARMACY-ADMISSION MEDICATION HISTORY
Admission Medication History Completed by Pharmacy    See [facility] Admission Navigator for allergy information, preferred outpatient pharmacy, prior to admission medications and immunization status.       Patient inappropriate at this time for medication reconciliation - no escalation of cares , DNR/DNI    Prior to Admission medications    Not on File       Date completed: 03/27/22    Medication history completed by: Marisa Alfonso, PharmD

## 2022-03-27 NOTE — ED NOTES
ED RESPIRATORY CARE NOTE    Pt arrived in ED at 0315, PEA arrest in progress.  Pt was intubated in the field with a 7.0 ET at approximately 24cm at the teeth.  SUSANNA was in place and pt ventilated with Ambu bag.ROSC was achieved at 0403.  Pt saturations were in the 50s to 60s; eventually into the 70s on FiO2 100% and PEEP 15.  Several attempts were made to place pt on ventilator, however saturations would drop and manual ventilation required was required to maintain higher saturations.  EMTs arrived and transported pt to [facility] at 0515    BP (!) 77/36   Pulse 91   Temp (!) 94.2  F (34.6  C) (Rectal)   Resp 24   SpO2 (!) 71% .       Holden Ruiz, RT

## 2022-03-27 NOTE — ED NOTES
Tried to call patients mother who is listed on the face sheet. It is the wrong number. Tried to call [facility] where patient was staying but they did not answer the phone. They did not send any paperwork because their computer was down so I was not able to notify any family.

## 2022-03-27 NOTE — PROGRESS NOTES
Multiple family members have called for updates.  Unclear who the point person is and who should be receiving information.    The patient's mother and a  are in the lobby.    CSI speaking with the patient's mother currently.      Family members have continued to call for information.  Deferred them to the patient's mother at this time until point person/POA is clarified.

## 2022-03-27 NOTE — PROGRESS NOTES
A visitor was wandering in the 4th floor hallways trying to find the patient.  He attempted to enter the patient's room.  He was told that the patient is COVID+ and no visitors are allowed.  He indicated that he was the patient's  and insisted on entering the room.  Security called.  The person started to video record.    Charge RN and Security involved.  CSI aware.    There was a Mandaen of people in the lobby that security called a code 21 on earlier.  They were told only the patient's mother could be in the lobby at this time.    Continue no visitor policy.  CSI will notify the patient's mother with updates.  She will remain in the lobby.    ADDENDUM: multiple people have checked in with security attempting to visit the patient while in Nuc Med.  Security came to 4E to confirm that no visitors are allowed.  No change in visitor policy or family update plans.  Security remains involved.     **Security plans to be in attendance when CSI provides updates after Nuc Med scan results.  If the family elects to transition the patient to comfort cares, security will work with family to determine the visitors who are allowed to visit the patient.  Security will be present on the unit when visitors are present.**

## 2022-03-27 NOTE — H&P
[facility]  Cardiac ICU H&P  2022     H and P   Leanne Ventura is a 31 year old male who was admitted on 3/27/2022.     Witnessed arest (y/n):y  Home or public location (y/n): in ambulance  Bystander CPR (y/n):y  Time of 911 call: unknown  Initial rhythm:PEA  Did they have intermittent ROSC (y/n):no  # of shocks: none  Epi: 2  mg  Amio: 0 mg     Patient was found down in his treatment facility with empty cleaning bottle of ammonium chloride. Patient was intubated en route to ER for GCS 3. During the ambulance ride, patient went into PEA arrest. Received 2mg epinephrine. Upon arrival at [facility] ER, patient was still in PEA. Noted to have bloody secretions from ET tube. Received 3mg of epi in the ER, 40 units of vasopressin, 2L of IVF, 2 units pRBC. Total arrest time ~60 minutes  COVID positive on check. Transferred to [facility] for possible VAECMO cannulation.     Initial rhythm in the cath lab: sinus  First AB.93/83/35 lactic acid 7.9   BP 70/31 (45)   Thermoguard catheter placed. Epi started.   Repeat AB.08/88/53 lactic acid 7.6   No VA ECMO cannulation done           Medications:   I have reviewed this patient's current medications    No past medical history on file.    No family history on file.  Social History     Socioeconomic History     Marital status: Single     Spouse name: Not on file     Number of children: Not on file     Years of education: Not on file     Highest education level: Not on file   Occupational History     Not on file   Tobacco Use     Smoking status: Not on file     Smokeless tobacco: Not on file   Substance and Sexual Activity     Alcohol use: Not on file     Drug use: Not on file     Sexual activity: Not on file   Other Topics Concern     Not on file   Social History Narrative     Not on file     Social Determinants of Health     Financial Resource Strain: Not on file   Food Insecurity: Not on file   Transportation Needs: Not on file    Physical Activity: Not on file   Stress: Not on file   Social Connections: Not on file   Intimate Partner Violence: Not on file   Housing Stability: Not on file            Review of Systems:   Unable to obtain ROS as patient is intubated and sedated          Physical Exam:   There were no vitals taken for this visit.    GEN: intubated, sedated   HEENT: no icterus  CV: RRR, normal s1/s2, no murmurs/rubs/s3/s4, no heave.   CHEST: mechanical breath sounds   ABD: soft, NT/ND, NABS  : no flank/suprapubic tenderness  NEURO: intubated sedated            Data:   All lab results reviewed    Recent Results (from the past 24 hour(s))   XR Chest Port 1 View    Narrative    EXAM: XR CHEST PORT 1 VIEW  LOCATION: [facility]  DATE/TIME: 3/27/2022 4:20 AM    INDICATION: ett cpr  COMPARISON: None.      Impression    IMPRESSION: Endotracheal tube 5.3 cm above jocelyn. Enteric tube tip in stomach and side hole in the distal esophagus. Advancement recommended. Cardiomediastinal silhouette within normal limits. Diffuse, bilateral infiltrates greater in the perihilar   regions could be secondary to pulmonary edema and/or pneumonia.              Assessment and Plan:   Leanne Ventura is a 31 year old male who was admitted on 3/27/2022.    Neurology: Intubated, sedated, paralyzed. Cooled to 34 degrees.   -RASS goal -4 to -5   - CT head ordered   Cardiovascular / Hemodynamics: PEA arrest secondary to ingestion of ammonium chloride  TTE: pending   EKG:pending   --wean pressors/inotropes as able  --hold temp at 34   Pulmonary: COVID positive  Hypoxic and hypercapneic respiratory failure  ETT in place. Now weaning vent requirements.  CXR: Lines in stable position.   --wean vent as able  --daily CXR  --Q2h ABGs for now  --COVID precautions  --Trial of diuretics   --If head CT is normal, will consider VV ECMO cannulation    GI and Nutrition: No known medical hx.  --CT C/A/P ordered    --monitor BID LFTs  --NPO  while cooled - nutrition consult pending feeding tube placement once he is warmed   --bowel regimen - on hold for now due to hypothermia  --GI Prophylaxis: PPI    Renal, Fluid and Electrolytes: --monitor urine output  --maintain K>3 and Mg>2    Infectious Disease: No signs of infection. Leukocytosis c/w arrest. Blood cultures collected.   --vancomycin/zosyn x5 days for ECMO  --daily blood cultures  --monitor for signs of infection given cooling, lines, and leukocytosis   Hematology and Oncology: --Transfuse as needed   Endocrinology: No known medical history.   Sliding scale insulin    Lines: Thermoguard: March 27, 2022  Femoral arterial line March 27, 2022  ETT March 27, 2022  Castano catheter March 27, 2022  OG tube March 27, 2022  Restraint: needed    Current lines are required for patient management       Family update by me today:No. No contact information available    Code Status: Full     Lisa Brown  Cardiology fellow

## 2022-03-27 NOTE — ED NOTES
Difficulty scanning blood products. Barcode will not read. Unable to type in manually.  Two Units of PRBC's given and charted on paper documentation. He received 2 units of blood.

## 2022-03-27 NOTE — ED NOTES
Spoke with Mitch Romero for family contact information. They gave me phone number for his mother Flores [phone]. Left a message to call back to the ER.

## 2022-03-27 NOTE — PROVIDER NOTIFICATION
Patient arrival to  from cath lab at 0700.  On Epi 0.1; vaso 4; levo 0.3. Sats 40s at 100% fio2 and 15 of peep.  Pupils fixed, dilated, NPI 0. No gag reflex.  Cooled to 34 degrees    Critical labs: PH 7.01; pc02 91; po2 29; lactic 6.3; ; trop 3,078;     Pressors maxed: epi at 0.3; levo 0.6; vaso 4. CT reviewed by CSI and neuro crit.      Status reviewed with teams. Given grim prognosis, no further escalation of cares.  DNR/DNI.  Teams have not been able to contact family.     Awaiting determination on re-warming plan and if elevated BGs should be treated

## 2022-03-27 NOTE — ED TRIAGE NOTES
Patient brought in by [facility] from [facility]. Patient was found unconscious at his facility. History of polysubstance abuse. Last known well time was 2300. He was breathing and did have a pulse. Deputies did give 2 doses of Narcan. There was a a empty cleaning bottle close to the patient that consisted of ammonia and  chloride. EMS was called. RSI done in the ambulance and patient then lost his pulse and CPR was started 20 minutes prior to arrival to the ER. He received 2 doses of epinephrine prior to arrival in the ER.

## 2022-03-27 NOTE — ED NOTES
Provider aware of critical labs results and blood pressure. Patient is with EMS and transferring to [facility] for Ecmo . Patient transferred over to EMS equipment.

## 2022-03-27 NOTE — ED PROVIDER NOTES
EMERGENCY DEPARTMENT ENCOUNTER      NAME: Leanne Ventura  AGE: 31 year old male  YOB: 1990  MRN: [id number]  EVALUATION DATE & TIME: 3/27/2022  3:18 AM    PCP: No primary care provider on file.    ED PROVIDER: Edmundo Tolbert MD        Chief Complaint   Patient presents with     Cardiac Arrest     Drug Overdose         FINAL IMPRESSION:  1. Cardiac arrest (H)          ED COURSE & MEDICAL DECISION MAKING:    Pertinent Labs & Imaging studies reviewed. (See chart for details)  31 year old male presents to the Emergency Department for evaluation of PEA cardiac arrest    Suspect patient's ingestion contributed to his arrest.  Also consider respiratory arrest.  Considered acidosis, hyperkalemia, hypothermia, pulmonary emboli, tamponade, tension pneumothorax.     With patient having bloody discharge from ET tube and from oral pharynx prior to medics arriving and patient having suspected drinking of cleaning solution pulmonary emboli unlikely and not given TPA    Patient was GCS of 3 after consuming unknown liquid at a treatment facility.  Received Narcan per report by law enforcement prior to paramedic arrival.  Paramedics report patient had a large amount of pink frothy material was intubated for GCS of 3.  Had pulses initially but lost pulses during intubation.  Per report intubated with ketamine and succinylcholine.     Received 2 mg epinephrine prehospital.  Blood sugar was reportedly 460    Upon arrival PEA arrest.    7-0 ETT tube.  Bloody secretions from ET tube.  Bilateral coarse breath sound.  2+ femoral and radial pulses with CPR.  GCS 3 with dilated pupils.     During pulse checks patient would lose pulses.  Did have cardiac activity on point-of-care ultrasound.  Due to acuity images not saved    Based on reports of ingestion did receive calcium chloride.  With patient reportedly having respiratory arrest with frothy excretions and difficult to ventilate despite intubation hypoxia received 2 A of  sodium bicarb.     Eventually found out he ingested ammonium chloride 90%.  He received sodium bicarb and calcium chloride prior to knowing this.     On pulse checks he was narrow complex bradycardic.  Received total in the ED and prehospital 5 mg epinephrine ( 1 mg x 5), 40 units vasopressin, 1 mg atropine for bradycardia and secretions, 2 L IV fluids were hung, 1 gram calcium chloride, and 2 amps of sodium bicarb.  Spoke with poison control regarding ingestion and they recommend supportive care.  Based on patient being 31 years old spoke with ECMO team and they agreed to take patient for ECMO. While discussing with ECMO team patient regained pulses.  Lab called to hemoglobin was 4.6 therefore 2 units of blood was ordered.  Given IV Protonix.  Castano was placed and temperature was checked was 94.2 degrees.  Chest x-ray shows ET tube in good position with OG below diaphram and bilat pulmonary edema.  Formal read pending    Spoke with ED doc at [facility].  ECMO team called back and were updated on patient's current vitals and agreed to take patient still for ECMO. Just prior to transfer received notification that patient is Covid positive.     Lab called saying sodium was 180 and potassium is less than 1.5, this was taking during resuscitation unclear how accurate this is.  Once ROSC was obtained repeat labs were ordered and show sodium 143 and K of 4.2. Bicarb is 10. Glucose 414. AST and ALT elevation of 227 and 190. actate 10.1 INR 2.25. Trop 0.65.     Patient was transported to emergency [place] [facility] for likely ECMO    EKG obtained during ROSC shows diffuse ischemic changes in a few PVCs with sinus tachycardia.                3:18 AM The patient arrived via EMS with SUSANNA going. I met the patient at the room. PPE: Provider wore gloves, N95 mask, eye protection, face shield, and surgical cap and gown.  3:20 AM Administered 1g Calcium Chloride and 1mg epi.  3:24 AM Momentarily stopped SUSANNA. Did a pulse  check. No pulse was found.  3:25 AM Administered another 1 mg epi.  3:27 AM Administered 1 amp of bicarb.  3:28 AM Hung 2L of normal saline  3:29 AM Momentarily stopped SUSANNA. Did a pulse check. No pulse was felt.  3:30 AM Administered Vasopressin.  3:33 AM Momentarily stopped SUSANNA. Did a pulse check. No pulse was felt.  3:34 AM Administered 1mg atropine.  3:35 AM Administered another dose of epi.  3:36 AM Momentarily stopped SUSANNA. Did a pulse check. No pulse was felt.  3:38 AM I called Poison control.  3:39 AM Momentarily stopped SUSANNA. Did a pulse check. No pulse was felt.  3:41 AM Momentarily stopped SSUANNA. Did a pulse check. No pulse was felt.  3:44 AM Administered Vasopressin.   3:45 AM Momentarily stopped SUSANNA. Did a pulse check. No pulse was felt.  3:49 AM EMS SUSANNA ran out of power. Manual compressions were started until Hospital's SUSANNA was started.  3:50 AM Paged for the [facility] ECMO/CPR   3:55 AM Momentarily stopped SUSANNA. Did a pulse check. No pulse was felt. US of the heart showed some cardiac activity.  3:57 AM Lab called.  3:59 AM Momentarily stopped SUSANNA. Did a pulse check. No pulse was felt.  4:02 AM I took a call regarding the [facility] ECMO/CPR team.  4:03 AM Momentarily stopped SUSANNA. Did a pulse check. Nursing staff felt pulse.  4:07 AM I ordered two units of uncrossed blood.  4:13 AM Charge nurse called for transfer to bring the patient to the [facility] to receive ECMO.  4:36 AM Transport for the patient arrived.  4:46 AM I spoke to ECMO team and updated them with current status of the patient. Still plan to transfer.  4:50 AM I received critical labs for the patient.   4:43 AM I was informed that the patient is COVID-19 positive.   4:58 AM I received critical labs for the patient. Lactic Acid of 10.1.    At the conclusion of the encounter I discussed the results of all of the tests and the disposition. The questions were answered. The patient or family acknowledged understanding  "and was agreeable with the care plan.     ED Course as of 03/27/22 0622   Sun Mar 27, 2022   0437 Patient presented in PEA cardiac arrest   0544 Hemoglobin(!!): 4.7  Hemoglobin is 4.7 therefore to units of uncrossed blood were given   0610 Protonix was given   0610 Platelet Count(!): 103   0610 Troponin I(!!): 0.65  Post Rosc troponin elevated which is not surprising   0611 INR(!): 2.25  Elevated INR came back after post ROSC, patient already transported from ER   0611 Lactic Acid(!!): 10.1  Post ROSC   0612 Sodium: 143   0612 Potassium: 4.2   0612 Carbon Dioxide(!): 10   0612 Anion Gap: 15   0612 Creatinine(!): 1.51   0612 Calcium(!!): 5.6   0612 Glucose(!!): 414   0612 Alkaline Phosphatase: 84   0612 AST(!): 227   0612 ALT(!): 190   0612 Bilirubin Total: 0.2  POST ROSC   0612 Ph Venous(!!): 7.66   0612 PCO2 Venous(!): >98  This was pre-ROSC   0612 Hemoglobin(!!): 4.7  Pre rosc   0612 PCO2 Venous(!): >98   0613 Ph Venous(!!): 7.66  Pre rosc           90 minutes of critical care time     MEDICATIONS GIVEN IN THE EMERGENCY:  Medications   pantoprazole (PROTONIX) IV push injection 80 mg (80 mg Intravenous Given 3/27/22 0439)       NEW PRESCRIPTIONS STARTED AT TODAY'S ER VISIT  There are no discharge medications for this patient.         =================================================================    HPI  HPI LIMITED due to acuity of condition    Triage note  \"Patient brought in by [facility] from [facility]. Patient was found unconscious at his facility. History of polysubstance abuse. Last known well time was 2300. He was breathing and did have a pulse. Deputies did give 2 doses of Narcan. There was a a empty cleaning bottle close to the patient that consisted of ammonia and  chloride. EMS was called. RSI done in the ambulance and patient then lost his pulse and CPR was started 20 minutes prior to arrival to the ER. He received 2 doses of epinephrine prior to arrival in the ER. " "  \"      Patient information was obtained from: EMS    Use of : N/A       Leanne Ventura is a 31 year old male with a pertinent history of polysubstance abuse who presents to this ED via EMS for evaluation of cardiac arrest.    Per EMS, the patient was found down at a treatment facility called [place] with a mostly empty bottle of ammonia chloride concentrate next to him. His last known normal was 2300 (2/26). He was breathing and did have a pulse at the time he was found -- no evidence of trauma. Deputies gave him 2 doses of Narcan. However en route via EMS, the patient coded and EMS did a RSI (with 300 ketamine and 200 succinate) and he lost his pulse so CPR with SUSANNA was started ~20 minutes prior to arrival. He was given 2 doses of epinephrine en route as well.     Later found out patient ingested Betco. Multi range ready to use . Ammonia Chloride 90 percent.       REVIEW OF SYSTEMS   Review of Systems   Unable to perform ROS: Acuity of condition       PAST MEDICAL HISTORY:  History reviewed. No pertinent past medical history.    PAST SURGICAL HISTORY:  History reviewed. No pertinent surgical history.        CURRENT MEDICATIONS:    No current outpatient medications on file.      ALLERGIES:  Not on File    FAMILY HISTORY:  History reviewed. No pertinent family history.    SOCIAL HISTORY:   Social History     Socioeconomic History     Marital status: Single     Spouse name: Not on file     Number of children: Not on file     Years of education: Not on file     Highest education level: Not on file   Occupational History     Not on file   Tobacco Use     Smoking status: Not on file     Smokeless tobacco: Not on file   Substance and Sexual Activity     Alcohol use: Not on file     Drug use: Not on file     Sexual activity: Not on file   Other Topics Concern     Not on file   Social History Narrative     Not on file     Social Determinants of Health     Financial Resource Strain: Not on " file   Food Insecurity: Not on file   Transportation Needs: Not on file   Physical Activity: Not on file   Stress: Not on file   Social Connections: Not on file   Intimate Partner Violence: Not on file   Housing Stability: Not on file       VITALS:  BP (!) 77/36   Pulse 91   Temp (!) 94.2  F (34.6  C) (Rectal)   Resp 24   SpO2 (!) 71%     PHYSICAL EXAM      Vitals: BP (!) 77/36   Pulse 91   Temp (!) 94.2  F (34.6  C) (Rectal)   Resp 24   SpO2 (!) 71%   General: Patient in c-collar, has bloody frothy secretions from ET tube as well as from mouth.   Eyes: Dilated pupils  HENT: Atraumatic.  Neck: In c-collar  Respiratory/Chest: 7-0 ET tube, bilateral breath sounds that are coarse.  Heart: 2+ radial and femoral pulses with jules device, no pulses without CPR  Abdomen: non distended  Musculoskeletal: Normal extremities. No edema or erythema.  Skin: Normal color. No rash or diaphoresis.  Neurologic: GCS 3i       LAB:  All pertinent labs reviewed and interpreted.  Results for orders placed or performed during the hospital encounter of 03/27/22   XR Chest Port 1 View    Impression    IMPRESSION: Endotracheal tube 5.3 cm above jocelyn. Enteric tube tip in stomach and side hole in the distal esophagus. Advancement recommended. Cardiomediastinal silhouette within normal limits. Diffuse, bilateral infiltrates greater in the perihilar   regions could be secondary to pulmonary edema and/or pneumonia.   Troponin I (now)   Result Value Ref Range    Troponin I 0.08 0.00 - 0.29 ng/mL   Comprehensive metabolic panel   Result Value Ref Range    Sodium >180 (HH) 136 - 145 mmol/L    Potassium 1.7 (LL) 3.5 - 5.0 mmol/L    Chloride <50 (L) 98 - 107 mmol/L    Carbon Dioxide (CO2) 37 (H) 22 - 31 mmol/L    Anion Gap      Urea Nitrogen 8 8 - 22 mg/dL    Creatinine 0.39 (L) 0.70 - 1.30 mg/dL    Calcium 4.1 (LL) 8.5 - 10.5 mg/dL    Glucose 187 (H) 70 - 125 mg/dL    Alkaline Phosphatase 21 (L) 45 - 120 U/L    AST 51 (H) 0 - 40 U/L    ALT  42 0 - 45 U/L    Protein Total 1.0 (L) 6.0 - 8.0 g/dL    Albumin 1.4 (L) 3.5 - 5.0 g/dL    Bilirubin Total <0.1 0.0 - 1.0 mg/dL    GFR Estimate >90 >60 mL/min/1.73m2   CBC with platelets and differential   Result Value Ref Range    WBC Count 5.1 4.0 - 11.0 10e3/uL    RBC Count 1.55 (L) 4.40 - 5.90 10e6/uL    Hemoglobin 4.7 (LL) 13.3 - 17.7 g/dL    Hematocrit 13.7 (L) 40.0 - 53.0 %    MCV 88 78 - 100 fL    MCH 30.3 26.5 - 33.0 pg    MCHC 34.3 31.5 - 36.5 g/dL    RDW 13.6 10.0 - 15.0 %    Platelet Count 103 (L) 150 - 450 10e3/uL   Blood gas venous   Result Value Ref Range    pH Venous 7.66 (HH) 7.35 - 7.45    pCO2 Venous >98 (H) 35 - 50 mm Hg    pO2 Venous 16 (L) 25 - 47 mm Hg    Bicarbonate Venous      Base Excess/Deficit (+/-)      Oxyhemoglobin Venous 16.9 (L) 70.0 - 75.0 %    O2 Sat, Venous 17.2 (L) 70.0 - 75.0 %   Extra Red Top Tube   Result Value Ref Range    Hold Specimen [facility]    Asymptomatic COVID-19 Virus (Coronavirus) by PCR Nasopharyngeal    Specimen: Nasopharyngeal; Swab   Result Value Ref Range    SARS CoV2 PCR Positive (A) Negative   Comprehensive metabolic panel   Result Value Ref Range    Sodium 143 136 - 145 mmol/L    Potassium 4.2 3.5 - 5.0 mmol/L    Chloride 118 (H) 98 - 107 mmol/L    Carbon Dioxide (CO2) 10 (L) 22 - 31 mmol/L    Anion Gap 15 5 - 18 mmol/L    Urea Nitrogen 15 8 - 22 mg/dL    Creatinine 1.51 (H) 0.70 - 1.30 mg/dL    Calcium 5.6 (LL) 8.5 - 10.5 mg/dL    Glucose 414 (HH) 70 - 125 mg/dL    Alkaline Phosphatase 84 45 - 120 U/L     (H) 0 - 40 U/L     (H) 0 - 45 U/L    Protein Total 2.8 (L) 6.0 - 8.0 g/dL    Albumin 1.6 (L) 3.5 - 5.0 g/dL    Bilirubin Total 0.2 0.0 - 1.0 mg/dL    GFR Estimate 63 >60 mL/min/1.73m2   Troponin I (now)   Result Value Ref Range    Troponin I 0.65 (HH) 0.00 - 0.29 ng/mL   Result Value Ref Range    INR 2.25 (H) 0.85 - 1.15   Result Value Ref Range    aPTT 83 (H) 22 - 38 Seconds   Lactic acid whole blood   Result Value Ref Range    Lactic Acid 10.1  (HH) 0.7 - 2.0 mmol/L   Manual Differential   Result Value Ref Range    % Neutrophils 40 %    % Lymphocytes 50 %    % Monocytes 3 %    % Eosinophils 0 %    % Basophils 0 %    % Metamyelocytes 4 %    % Myelocytes 3 %    NRBCs per 100 WBC 13 (H) <=0 %    Absolute Neutrophils 2.0 1.6 - 8.3 10e3/uL    Absolute Lymphocytes 2.6 0.8 - 5.3 10e3/uL    Absolute Monocytes 0.2 0.0 - 1.3 10e3/uL    Absolute Eosinophils 0.0 0.0 - 0.7 10e3/uL    Absolute Basophils 0.0 0.0 - 0.2 10e3/uL    Absolute Metamyelocytes 0.2 (H) <=0.0 10e3/uL    Absolute Myelocytes 0.2 (H) <=0.0 10e3/uL    Absolute NRBCs 0.7 (H) <=0.0 10e3/uL    RBC Morphology Confirmed RBC Indices     Platelet Assessment  Automated Count Confirmed. Platelet morphology is normal.     Automated Count Confirmed. Platelet morphology is normal.    Gray Cells Moderate (A) None Seen    RBC Fragments Slight (A) None Seen    Smudge Cells Present (A) None Seen   Prepare red blood cells (unit)   Result Value Ref Range    UNIT ABO/RH O Neg     Unit Number [id number]     Unit Status Issued     Blood Component Type Red Blood Cells     Product Code A8900N07     ISSUE DATE AND TIME [id number]     CODING SYSTEM IZWX226     UNIT TYPE ISBT 9500    Prepare red blood cells (unit)   Result Value Ref Range    UNIT ABO/RH O Neg     Unit Number [id number]     Unit Status Issued     Blood Component Type Red Blood Cells     Product Code L6244T44     ISSUE DATE AND TIME [id number]     CODING SYSTEM [id number]     UNIT TYPE ISBT 9500        RADIOLOGY:  Reviewed all pertinent imaging. Please see official radiology report.  XR Chest Port 1 View   Final Result   IMPRESSION: Endotracheal tube 5.3 cm above jocelyn. Enteric tube tip in stomach and side hole in the distal esophagus. Advancement recommended. Cardiomediastinal silhouette within normal limits. Diffuse, bilateral infiltrates greater in the perihilar    regions could be secondary to pulmonary edema and/or pneumonia.          EKG:     Performed at: Post ROSC 27 Mar 2022 0419    Impression: sinus tachycardia with frequent PVCs    Rate: 103  Rhythm: sinus  Axis: 92  MT Interval: 168  QRS Interval: 84  QTc Interval: 427  ST Changes: diffuse ST changes    I have independently reviewed and interpreted the EKG(s) documented above.          I, Balbir Anthony, am serving as a scribe to document services personally performed by Gasper Tolbert MD based on my observation and the provider's statements to me. I, Dr. Gasper Tolbert, attest that Balbir Anthony is acting in a scribe capacity, has observed my performance of the services and has documented them in accordance with my direction.    Gasper Tolbert MD  Emergency Medicine  [facility] EMERGENCY DEPARTMENT  [address] [place] [id number] [phone]     Gasper Tolbert MD  03/27/22 0622

## 2022-03-27 NOTE — PROGRESS NOTES
Per CSI, brain death determined. Time of death 1706 per Nuc Med read    CSI spoke with family.     Pressors to be stopped.  Vent to be turned off.  Patient to be disconnected from monitor.

## 2022-03-27 NOTE — PROGRESS NOTES
Pt came to 4E from Cath Lab and CT. Upon arrival, pt's SpO2 53%. Vent settings changed from PC to VC given low tidal volumes/minute ventilation. CMV-VC settings on 26 480 +15 100%. Despite higher PEEP and FiO2 settings, pt's SpO2 still not improving. Pt then was bagged for about 10 mins, again without improvement in SpO2 then put back on the vent. Awaiting for further decisions to be towards pt care.    RT will continue to follow and monitor.    Ivan Wiseman, RRT

## 2022-03-27 NOTE — CONSULTS
[facility]  NeuroCritical Care Consult    Patient Name:  Leanne Ventura  MRN:  [id number]    :  1990  Date of Service:  2022  Primary care provider:  No primary care provider on file.      Reason for Consult: Asked by Critical Care Cardiology to see Leanne Ventura following cardiac arrest    History of Present Illness:   Leanne Ventura is a 31 year old admitted on 3/27/2022 following a cardiac arrest.      Patient was found down in his treatment facility ([place]) near an empty bottle of ammonium chloride. Law enforcement administered Narcan without improvement. On EMS evaluation, his GCS was 3 and had copious pink frothy secretions, thus was intubated in the field. En route, he sustained PEA arrest and was placed on SUSANNA. He received 2mg epinephrine prior to arrival at [facility] ED. He received 3mg epinephrine and 40 units of vasopressin. He was found to be COVID positive. ROSC was not achieved after ~60 minutes and he accepted as a transfer to [facility] for [facility] of [facility]O cannulation. Prior to transfer, he regained pulses.     ROS: History obtained by chart review. The patient is intubated and sedated.    PMH:  Unknown    Allergies:  Unknown    Medications:      Current Facility-Administered Medications:      artificial tears ophthalmic ointment, , Both Eyes, Q8H, Lisa Brown MD, Given at 22 1345     calcium chloride in  mL intermittent infusion 1 g, 1 g, Intravenous, Q6H PRN, Lisa Brown MD     dextrose 10% infusion, , Intravenous, Continuous PRN, Reyes Castro, Jorge, MD     glucose gel 15-30 g, 15-30 g, Oral, Q15 Min PRN **OR** dextrose 50 % injection 25-50 mL, 25-50 mL, Intravenous, Q15 Min PRN **OR** glucagon injection 1 mg, 1 mg, Subcutaneous, Q15 Min PRN, Reyes Castro, Jorge, MD     EPINEPHrine (ADRENALIN) 16 mg in sodium chloride 0.9 % 250 mL infusion CENTRAL, 0.01-0.3 mcg/kg/min  (Dosing Weight), Intravenous, Continuous, Invasive, Cardiologist, MD, Last Rate: 28.1 mL/hr at 03/27/22 1500, 0.3 mcg/kg/min at 03/27/22 1500     insulin 1 unit/mL in saline (NovoLIN, HumuLIN Regular) drip - ADULT IV Infusion, 0-24 Units/hr, Intravenous, Continuous, Reyes Castro, Jorge, MD, Last Rate: 0.5 mL/hr at 03/27/22 1500, 0.5 Units/hr at 03/27/22 1500     lidocaine (LMX4) cream, , Topical, Q1H PRN, Lisa Brown MD     lidocaine 1 % 0.1-1 mL, 0.1-1 mL, Other, Q1H PRN, Lisa Brown MD     magnesium sulfate 2 g in water intermittent infusion, 2 g, Intravenous, Daily PRN, Lisa Brown MD     magnesium sulfate 4 g in 100 mL sterile water (premade), 4 g, Intravenous, Q4H PRN, Lisa Brown MD     meperidine (DEMEROL) injection 25-50 mg, 25-50 mg, Intravenous, Q1H PRN, Lisa Brown MD     naloxone (NARCAN) injection 0.2 mg, 0.2 mg, Intravenous, Q2 Min PRN **OR** naloxone (NARCAN) injection 0.4 mg, 0.4 mg, Intravenous, Q2 Min PRN **OR** naloxone (NARCAN) injection 0.2 mg, 0.2 mg, Intramuscular, Q2 Min PRN **OR** naloxone (NARCAN) injection 0.4 mg, 0.4 mg, Intramuscular, Q2 Min PRN, Invasive, CardiologMD hansa     norepinephrine (LEVOPHED) 16 mg in  mL infusion MAX CONC CENTRAL LINE, 0.01-0.6 mcg/kg/min (Dosing Weight), Intravenous, Continuous, Invasive, CardiologMD hansa, Last Rate: 56.3 mL/hr at 03/27/22 1500, 0.6 mcg/kg/min at 03/27/22 1500     pantoprazole (PROTONIX) IV push injection 40 mg, 40 mg, Intravenous, Daily, Lisa Brown MD, 40 mg at 03/27/22 0927     potassium chloride 20 mEq in 100 mL NON-STANDARD CONC intermittent infusion, 20 mEq, Intravenous, Q1H PRN, Lisa Brown MD     sodium chloride (PF) 0.9% PF flush 3 mL, 3 mL, Intracatheter, q1 min prn, Lisa Brown MD     sodium phosphate 10 mmol in D5W 250 mL intermittent infusion, 10 mmol, Intravenous, Daily PRN, Kevin,  Lisa Live MD     sodium phosphate 15 mmol in D5W intermittent infusion, 15 mmol, Intravenous, Daily PRN, Lisa Brown MD     sodium phosphate 20 mmol in D5W 250 mL intermittent infusion, 20 mmol, Intravenous, Q6H PRN, Lisa Brown MD     sodium phosphate 25 mmol in D5W 250 mL intermittent infusion, 25 mmol, Intravenous, Q8H PRN, Lisa Brown MD     vasopressin 1 unit/mL MAX Conc (PITRESSIN) infusion, 0.5-4 Units/hr, Intravenous, Continuous, Invasive, Cardiologist, MD, Last Rate: 4 mL/hr at 22 1541, 4 Units/hr at 22 1541    Social History:  Unknown    Family History:    Unknown    Physical Examination:   Pulse (!) 127   Temp 98.6  F (37  C)   Resp 26   SpO2 (!) 84%   EXAM: Examined off sedation during cooling protocol  - No spontaneous eye opening or in response to verbal or tactile stimuli, does not follow commands  - No eye movements, conjugate gaze  - L Pupil +7, NPI 0, R pupil +6, NPI 0 (recorded per pupillometer)  - Absent corneal response  - No clear facial asymmetry  - No cough or gag present with deep suctioning  - No limb movements, does not withdraw to noxious stimuli      Imagin. CT Head w/o contrast on 3/27/2022 at 0710  Impression:   1. 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     Impression:  Leanne Ventura is a 31 year old male who was admitted on 3/27/2022 following PEA arrest. The NCC service was consulted to evaluate for prognostication. His neurologic examination is limited as the patient remains cooled at this time.     Recommendations:   Patients initial CT head very concerning for diffuse hypoxic brain injury. Given the severity of his CT head the decision was made early this morning to rewarm the patient with plans of brain death testing when normothermic. First exam completed at 13:25 was supportive of brain death. This exam will be followed by a nuclear medicine brain scan  w/vascular flow to confirm brain death. Study ordered. Will follow up results. Please call *[phone] with any questions or concerns.      Dede NICHOLAS CNP  NeuroCritical Care Nurse Practitioner  Pager: [phone]  Ascom: *[phone] available M-Pool 0700 to 1700

## 2022-03-27 NOTE — PROGRESS NOTES
Significant Event Note    Time of event: 03/27/22    Description of event:  Formal Neurological Examination for Determination of 'Brain Death'    Exam:  B/P: Data Unavailable, T: 98.6, P: 127, R: 26  No spontaneous breathing above low ventilator settings  No significant derangement of labs that would preclude testing.  Irreversible coma of known cause.  History and imaging supporting of brain death.    Time of 1st Exam: 03/27/22 1325     No response to voice or noxious stimuli  Pupils fixed and dilated with no response to light  No Corneal reflex  Unable to perform Oculocephalic reflex due to collar  No Cough reflex to tracheal suctioning  No Gag reflex  No eye movements with cold caloric testing waiting 5-10 minutes between ears  No motor response to noxious stimuli in any extremity    Unable to perform apnea testing due to concerns for hemodynamic instability    Nuclear medicine brain perfusion scan performed as ancillary testing to confirm death by neurological criteria (brain death)    Date/Time of Death: 03/27/22, 1706    Neli Alfonso MD  Neurocritical Care  Date of Service (when I saw the patient): 03/27/22

## 2022-03-27 NOTE — Clinical Note
Potential access sites were evaluated for patency using ultrasound.   The right femoral vein was selected. Access was obtained under Sonosite and Fluoroscopic guidance using a standard 18 gauge needle with direct visualization of needle entry.      05-Aug-2020 11:51

## 2022-03-27 NOTE — DEATH PRONOUNCEMENT
MD DEATH PRONOUNCEMENT    Called to pronounce Leanne Ventura dead.    Physical Exam: Unresponsive to noxious stimuli    Patient was pronounced dead at 5:06 PM, 2022 by nuclear brain scan     Preliminary Cause of Death: Hypoxic respiratory     Active Problems:    Cardiac arrest (H)    Acute kidney failure with tubular necrosis (H)       Infectious disease present?: YES    Communicable disease present? COVID    Multi-drug resistant organism present? (example: MRSA): NO    Please consider an autopsy if any of the following exist:  NO Unexpected or unexplained death during or following any dental, medical, or surgical diagnostic treatment procedures.   NO Death of mother at or up to seven days after delivery.     NO All  and pediatric deaths.     NO Death where the cause is sufficiently obscure to delay completion of the death certificate.   NO Deaths in which autopsy would confirm a suspected illness/condition that would affect surviving family members or recipients of transplanted organs.     The following deaths must be reported to the 's Office:  NO A death that may be due entirely or in part to any factors other than natural disease (recent surgery, recent trauma, suspected abuse/neglect).   NO A death that may be an accident, suicide, or homicide.     NO Any sudden, unexpected death in which there is no prior history of significant heart disease or any other condition associated with sudden death.   NO A death under suspicious, unusual, or unexpected circumstances.    NO Any death which is apparently due to natural causes but in which the  does not have a personal physician familiar with the patient s medical history, social, or environmental situation or the circumstances of the terminal event.   NO Any death apparently due to Sudden Infant Death Syndrome.     NO Deaths that occur during, in association with, or as consequences of a diagnostic, therapeutic, or anesthetic  procedure.   NO Any death in which a fracture of a major bone has occurred within the past (6) six months.   NO A death of persons note seen by their physician within 120 days of demise.     NO Any death in which the  was an inmate of a public institution or was in the custody of Law Enforcement personnel.   NO  All unexpected deaths of children   NO Solid organ donors   NO Unidentified bodies   NO Deaths of persons whose bodies are to be cremated or otherwise disposed of so that the bodies will later be unavailable for examination;   NO Deaths unattended by a physician outside of a licensed healthcare facility or licensed residential hospice program   NO Deaths occurring within 24 hours of arrival to a health care facility if death is unexpected.    NO Deaths associated with the decedent s employment.   NO Deaths attributed to acts of terrorism.   NO Any death in which there is uncertainty as to whether it is a medical examiner s care should be discussed with the medical investigator.      Family consented to autopsy

## 2022-03-27 NOTE — ED NOTES
Please see code blue paper documentation for code. Patient was in PEA from 03:18(time of arrival to the ER) to 4:03 when patient found to have a pulse and be in Afib.Unable to get vital signs during that time. Oxygen saturation mainly in the 50's while being bagged. Unable to get ETCO2 due to the amount of blood coming out of the ET tube and backing up into the end tidal tubing.

## 2022-03-28 LAB
ATRIAL RATE - MUSE: 121 BPM
ATRIAL RATE - MUSE: 122 BPM
DIASTOLIC BLOOD PRESSURE - MUSE: NORMAL MMHG
DIASTOLIC BLOOD PRESSURE - MUSE: NORMAL MMHG
INTERPRETATION ECG - MUSE: NORMAL
INTERPRETATION ECG - MUSE: NORMAL
P AXIS - MUSE: 54 DEGREES
P AXIS - MUSE: 57 DEGREES
PR INTERVAL - MUSE: 136 MS
PR INTERVAL - MUSE: 144 MS
QRS DURATION - MUSE: 104 MS
QRS DURATION - MUSE: 98 MS
QT - MUSE: 304 MS
QT - MUSE: 340 MS
QTC - MUSE: 433 MS
QTC - MUSE: 482 MS
R AXIS - MUSE: 60 DEGREES
R AXIS - MUSE: 63 DEGREES
SYSTOLIC BLOOD PRESSURE - MUSE: NORMAL MMHG
SYSTOLIC BLOOD PRESSURE - MUSE: NORMAL MMHG
T AXIS - MUSE: 101 DEGREES
T AXIS - MUSE: 85 DEGREES
VENTRICULAR RATE- MUSE: 121 BPM
VENTRICULAR RATE- MUSE: 122 BPM

## 2022-03-28 NOTE — DISCHARGE SUMMARY
Brief Discharge Summary      31 y.o male who Patient was found down in his treatment facility with empty cleaning bottle of ammonium chloride. Patient was intubated en route to ER for GCS 3. During the ambulance ride, patient went into PEA arrest. Received 2mg epinephrine. Upon arrival at [facility] ER, patient was still in PEA. Noted to have bloody secretions from ET tube. Received 3mg of epi in the ER, 40 units of vasopressin, 2L of IVF, 2 units pRBC. Total arrest time ~60 minutes. COVID positive on check. Transferred to [facility] for further management. Unfortunately initial head CT with evidence of severe hypoxic brain injury. Given the severity of his CT head the decision was made early this morning to rewarm the patient with plans of brain death testing when normothermic. First exam completed at 13:25 was supportive of brain death. Nuclear brain scan  w/vascular flow confirmed this and was declared death at 5:06 pm.    Problem list:  -PEA arrest secondary to ingestion of ammonium chloride  -COVID positive  -Hypoxic and hypercapneic respiratory failure  -Anoxic brain injury      Jorge Reyes Castro, MD, MS  Cardiology Follow

## 2022-03-28 NOTE — PROGRESS NOTES
Writer took over Pt from 1900 - 2130. Family at bedside. [facility] called. Medical examiner phone number given to designated point of contact for patient. Security transferred Pt to [facility] at 1930.

## 2022-03-29 LAB
INTERPRETATION TEGPIA: NORMAL
NSE SERPL IA-MCNC: 75.3 NG/ML
PA AA BLD-ACNC: 92 %
PA ADP BLD-ACNC: 77 %

## 2022-04-01 LAB
BACTERIA BLD CULT: NO GROWTH
BACTERIA BLD CULT: NO GROWTH

## 2022-04-08 LAB
AMPHET BLD CFM-MCNC: NEGATIVE NG/ML
APAP BLD-MCNC: NEGATIVE UG/ML
BARBITURATES SPEC-MCNC: NEGATIVE UG/ML
BENZODIAZ SPEC-MCNC: NEGATIVE NG/ML
BUPRENORPHINE SERPL-MCNC: NEGATIVE NG/ML
BZE BLD CFM-MCNC: NEGATIVE NG/ML
CARBOXYTHC BLD-MCNC: NEGATIVE NG/ML
CARISOPRODOL IA: NEGATIVE UG/ML
DECLARED MEDICATIONS: ABNORMAL
DRUGS FLD: POSITIVE
ETHANOL BLD-MCNC: NEGATIVE GM/DL
FENTANYL BLD CFM-MCNC: 1.2 NG/ML
FENTANYL IA: ABNORMAL NG/ML
FENTANYL SPEC QL: POSITIVE
GABAPENTIN IA: NEGATIVE UG/ML
MEPERIDINE SERPLBLD-MCNC: NEGATIVE NG/ML
METHADONE SAL CFM-MCNC: NEGATIVE NG/ML
NORFENTANYL BLD CFM-MCNC: 0.6 NG/ML
OPIATES SPEC-MCNC: NEGATIVE NG/ML
OXYCODONE SERPLBLD SCN-MCNC: NEGATIVE NG/ML
PCP SPEC-MCNC: NEGATIVE NG/ML
PROPOXYPH SPEC-MCNC: NEGATIVE NG/ML
TRAMADOL BLD-MCNC: NEGATIVE NG/ML

## 2022-04-12 LAB — S100 CA BINDING PROTEIN B SER-MCNC: 3061 NG/L

## (undated) DEVICE — PACK HEART LEFT CUSTOM

## (undated) DEVICE — CATH QUATTRO 9.3FR  FEM INSTRN

## (undated) DEVICE — MANIFOLD KIT ANGIO AUTOMATED 014613

## (undated) DEVICE — INTRO SHEATH 6FRX25CM PINNACLE RSS606

## (undated) DEVICE — TUBING PRESSURE 30"

## (undated) DEVICE — KIT HAND CONTROL ACIST 014644 AR-P54

## (undated) RX ORDER — FUROSEMIDE 10 MG/ML
INJECTION INTRAMUSCULAR; INTRAVENOUS
Status: DISPENSED
Start: 2022-01-01